# Patient Record
Sex: FEMALE | Race: WHITE | Employment: FULL TIME | URBAN - METROPOLITAN AREA
[De-identification: names, ages, dates, MRNs, and addresses within clinical notes are randomized per-mention and may not be internally consistent; named-entity substitution may affect disease eponyms.]

---

## 2017-11-29 ENCOUNTER — TRANSFERRED RECORDS (OUTPATIENT)
Dept: HEALTH INFORMATION MANAGEMENT | Facility: CLINIC | Age: 42
End: 2017-11-29

## 2017-11-29 LAB
HPV ABSTRACT: NORMAL
PAP-ABSTRACT: NORMAL

## 2020-01-19 ENCOUNTER — HOSPITAL ENCOUNTER (EMERGENCY)
Facility: CLINIC | Age: 45
Discharge: HOME | End: 2020-01-19
Attending: EMERGENCY MEDICINE | Admitting: EMERGENCY MEDICINE
Payer: COMMERCIAL

## 2020-01-19 ENCOUNTER — APPOINTMENT (OUTPATIENT)
Dept: CT IMAGING | Facility: CLINIC | Age: 45
End: 2020-01-19
Attending: EMERGENCY MEDICINE
Payer: COMMERCIAL

## 2020-01-19 VITALS
RESPIRATION RATE: 28 BRPM | OXYGEN SATURATION: 100 % | HEART RATE: 51 BPM | SYSTOLIC BLOOD PRESSURE: 105 MMHG | WEIGHT: 128 LBS | TEMPERATURE: 97.3 F | DIASTOLIC BLOOD PRESSURE: 54 MMHG

## 2020-01-19 DIAGNOSIS — N23 RENAL COLIC: ICD-10-CM

## 2020-01-19 LAB
ALBUMIN UR-MCNC: 10 MG/DL
ANION GAP SERPL CALCULATED.3IONS-SCNC: 9 MMOL/L (ref 3–14)
APPEARANCE UR: CLEAR
BACTERIA #/AREA URNS HPF: ABNORMAL /HPF
BASOPHILS # BLD AUTO: 0 10E9/L (ref 0–0.2)
BASOPHILS NFR BLD AUTO: 0.3 %
BILIRUB UR QL STRIP: NEGATIVE
BUN SERPL-MCNC: 15 MG/DL (ref 7–30)
CALCIUM SERPL-MCNC: 8.9 MG/DL (ref 8.5–10.1)
CHLORIDE SERPL-SCNC: 108 MMOL/L (ref 94–109)
CO2 SERPL-SCNC: 22 MMOL/L (ref 20–32)
COLOR UR AUTO: YELLOW
CREAT SERPL-MCNC: 0.87 MG/DL (ref 0.52–1.04)
DIFFERENTIAL METHOD BLD: NORMAL
EOSINOPHIL # BLD AUTO: 0.2 10E9/L (ref 0–0.7)
EOSINOPHIL NFR BLD AUTO: 2.2 %
ERYTHROCYTE [DISTWIDTH] IN BLOOD BY AUTOMATED COUNT: 12.3 % (ref 10–15)
GFR SERPL CREATININE-BSD FRML MDRD: 81 ML/MIN/{1.73_M2}
GLUCOSE SERPL-MCNC: 154 MG/DL (ref 70–99)
GLUCOSE UR STRIP-MCNC: NEGATIVE MG/DL
HCG SERPL QL: NEGATIVE
HCT VFR BLD AUTO: 41.6 % (ref 35–47)
HGB BLD-MCNC: 13.5 G/DL (ref 11.7–15.7)
HGB UR QL STRIP: NEGATIVE
IMM GRANULOCYTES # BLD: 0 10E9/L (ref 0–0.4)
IMM GRANULOCYTES NFR BLD: 0.3 %
KETONES UR STRIP-MCNC: 20 MG/DL
LEUKOCYTE ESTERASE UR QL STRIP: NEGATIVE
LYMPHOCYTES # BLD AUTO: 2.8 10E9/L (ref 0.8–5.3)
LYMPHOCYTES NFR BLD AUTO: 29.2 %
MCH RBC QN AUTO: 30.8 PG (ref 26.5–33)
MCHC RBC AUTO-ENTMCNC: 32.5 G/DL (ref 31.5–36.5)
MCV RBC AUTO: 95 FL (ref 78–100)
MONOCYTES # BLD AUTO: 0.7 10E9/L (ref 0–1.3)
MONOCYTES NFR BLD AUTO: 7.5 %
MUCOUS THREADS #/AREA URNS LPF: PRESENT /LPF
NEUTROPHILS # BLD AUTO: 5.8 10E9/L (ref 1.6–8.3)
NEUTROPHILS NFR BLD AUTO: 60.5 %
NITRATE UR QL: NEGATIVE
NRBC # BLD AUTO: 0 10*3/UL
NRBC BLD AUTO-RTO: 0 /100
PH UR STRIP: 6.5 PH (ref 5–7)
PLATELET # BLD AUTO: 189 10E9/L (ref 150–450)
POTASSIUM SERPL-SCNC: 3.8 MMOL/L (ref 3.4–5.3)
RBC # BLD AUTO: 4.38 10E12/L (ref 3.8–5.2)
RBC #/AREA URNS AUTO: 10 /HPF (ref 0–2)
SODIUM SERPL-SCNC: 139 MMOL/L (ref 133–144)
SOURCE: ABNORMAL
SP GR UR STRIP: 1.02 (ref 1–1.03)
SQUAMOUS #/AREA URNS AUTO: 1 /HPF (ref 0–1)
UROBILINOGEN UR STRIP-MCNC: NORMAL MG/DL (ref 0–2)
WBC # BLD AUTO: 9.6 10E9/L (ref 4–11)
WBC #/AREA URNS AUTO: 3 /HPF (ref 0–5)

## 2020-01-19 PROCEDURE — 25000132 ZZH RX MED GY IP 250 OP 250 PS 637: Performed by: EMERGENCY MEDICINE

## 2020-01-19 PROCEDURE — 25000128 H RX IP 250 OP 636: Performed by: EMERGENCY MEDICINE

## 2020-01-19 PROCEDURE — 74176 CT ABD & PELVIS W/O CONTRAST: CPT

## 2020-01-19 PROCEDURE — 85025 COMPLETE CBC W/AUTO DIFF WBC: CPT | Performed by: EMERGENCY MEDICINE

## 2020-01-19 PROCEDURE — 80048 BASIC METABOLIC PNL TOTAL CA: CPT | Performed by: EMERGENCY MEDICINE

## 2020-01-19 PROCEDURE — 25800030 ZZH RX IP 258 OP 636: Performed by: EMERGENCY MEDICINE

## 2020-01-19 PROCEDURE — 96374 THER/PROPH/DIAG INJ IV PUSH: CPT

## 2020-01-19 PROCEDURE — 99285 EMERGENCY DEPT VISIT HI MDM: CPT | Mod: 25

## 2020-01-19 PROCEDURE — 96376 TX/PRO/DX INJ SAME DRUG ADON: CPT

## 2020-01-19 PROCEDURE — 81001 URINALYSIS AUTO W/SCOPE: CPT | Performed by: EMERGENCY MEDICINE

## 2020-01-19 PROCEDURE — 84703 CHORIONIC GONADOTROPIN ASSAY: CPT | Performed by: EMERGENCY MEDICINE

## 2020-01-19 PROCEDURE — 96375 TX/PRO/DX INJ NEW DRUG ADDON: CPT

## 2020-01-19 PROCEDURE — 96361 HYDRATE IV INFUSION ADD-ON: CPT

## 2020-01-19 RX ORDER — ONDANSETRON 2 MG/ML
4 INJECTION INTRAMUSCULAR; INTRAVENOUS ONCE
Status: COMPLETED | OUTPATIENT
Start: 2020-01-19 | End: 2020-01-19

## 2020-01-19 RX ORDER — HYDROMORPHONE HYDROCHLORIDE 1 MG/ML
0.5 INJECTION, SOLUTION INTRAMUSCULAR; INTRAVENOUS; SUBCUTANEOUS ONCE
Status: COMPLETED | OUTPATIENT
Start: 2020-01-19 | End: 2020-01-19

## 2020-01-19 RX ORDER — KETOROLAC TROMETHAMINE 15 MG/ML
15 INJECTION, SOLUTION INTRAMUSCULAR; INTRAVENOUS ONCE
Status: COMPLETED | OUTPATIENT
Start: 2020-01-19 | End: 2020-01-19

## 2020-01-19 RX ORDER — SODIUM CHLORIDE 9 MG/ML
1000 INJECTION, SOLUTION INTRAVENOUS CONTINUOUS
Status: DISCONTINUED | OUTPATIENT
Start: 2020-01-19 | End: 2020-01-19 | Stop reason: HOSPADM

## 2020-01-19 RX ORDER — OXYCODONE HYDROCHLORIDE 5 MG/1
5 TABLET ORAL EVERY 6 HOURS PRN
Qty: 12 TABLET | Refills: 0 | Status: SHIPPED | OUTPATIENT
Start: 2020-01-19 | End: 2020-01-28

## 2020-01-19 RX ORDER — IBUPROFEN 600 MG/1
600 TABLET, FILM COATED ORAL EVERY 6 HOURS PRN
Qty: 30 TABLET | Refills: 1 | Status: SHIPPED | OUTPATIENT
Start: 2020-01-19 | End: 2020-01-28

## 2020-01-19 RX ORDER — OXYCODONE HYDROCHLORIDE 5 MG/1
5 TABLET ORAL ONCE
Status: COMPLETED | OUTPATIENT
Start: 2020-01-19 | End: 2020-01-19

## 2020-01-19 RX ORDER — ONDANSETRON 4 MG/1
4 TABLET, ORALLY DISINTEGRATING ORAL EVERY 6 HOURS PRN
Qty: 10 TABLET | Refills: 0 | Status: ON HOLD | OUTPATIENT
Start: 2020-01-19 | End: 2020-01-24

## 2020-01-19 RX ORDER — TAMSULOSIN HYDROCHLORIDE 0.4 MG/1
0.4 CAPSULE ORAL DAILY
Qty: 10 CAPSULE | Refills: 0 | Status: ON HOLD | OUTPATIENT
Start: 2020-01-19 | End: 2020-01-24

## 2020-01-19 RX ADMIN — SODIUM CHLORIDE 1000 ML: 9 INJECTION, SOLUTION INTRAVENOUS at 11:21

## 2020-01-19 RX ADMIN — ONDANSETRON HYDROCHLORIDE 4 MG: 2 INJECTION, SOLUTION INTRAMUSCULAR; INTRAVENOUS at 11:22

## 2020-01-19 RX ADMIN — HYDROMORPHONE HYDROCHLORIDE 0.5 MG: 1 INJECTION, SOLUTION INTRAMUSCULAR; INTRAVENOUS; SUBCUTANEOUS at 12:11

## 2020-01-19 RX ADMIN — ONDANSETRON HYDROCHLORIDE 4 MG: 2 INJECTION, SOLUTION INTRAMUSCULAR; INTRAVENOUS at 13:24

## 2020-01-19 RX ADMIN — OXYCODONE HYDROCHLORIDE 5 MG: 5 TABLET ORAL at 14:48

## 2020-01-19 RX ADMIN — KETOROLAC TROMETHAMINE 15 MG: 15 INJECTION, SOLUTION INTRAMUSCULAR; INTRAVENOUS at 11:21

## 2020-01-19 RX ADMIN — HYDROMORPHONE HYDROCHLORIDE 0.5 MG: 1 INJECTION, SOLUTION INTRAMUSCULAR; INTRAVENOUS; SUBCUTANEOUS at 11:22

## 2020-01-19 ASSESSMENT — ENCOUNTER SYMPTOMS
FLANK PAIN: 1
VOMITING: 1
NAUSEA: 1

## 2020-01-19 NOTE — ED TRIAGE NOTES
Left flank pain started today at 0900, nausea vomiting as well. She is feeling pressure and urinary urgency.

## 2020-01-19 NOTE — ED PROVIDER NOTES
History   Chief Complaint:  Flank Pain; Nausea & Vomiting; and Urgency     HPI   Miguelina Weathers is a 44 year old female with history of ruptured ovarian cysts who presents with flank pain, nausea, vomiting, and urgency.the patient reports she was uncomfortable last night with urinary urgency and thought she might be getting a bladder infection. However, this morning she had intense onset of left flank pain that started at 0900 with associated nausea and vomiting. She has not taken any medication for pain. The patient denies a history of kidney stones but does have a history of ruptured ovarian cysts.     Allergies:  Cephalexin  Codeine  Penicillins  Sulfa Drugs  Erythromycin    Medications:   Mirena    Past Medical History:    Menorrhagia with regular cycle  Ruptured ovarian cysts  UTI  Migraine    Past Surgical History:    [place] teeth extraction  Tonsillectomy   section  Hysteroscopy     Family History:    Mother: heart disease    Social History:  Smoking Status: Never Smoker  Smokeless Tobacco: Never Used  Alcohol Use: Yes    Review of Systems   Gastrointestinal: Positive for nausea and vomiting.   Genitourinary: Positive for flank pain and urgency.   All other systems reviewed and are negative.    Physical Exam     Patient Vitals for the past 24 hrs:   BP Temp Temp src Pulse Heart Rate Resp SpO2 Weight   20 1445 105/54 -- -- 51 -- -- 100 % --   20 1430 125/85 -- -- 59 -- -- 100 % --   20 1415 130/85 -- -- 56 -- -- 100 % --   20 1400 136/84 -- -- 78 -- -- 100 % --   20 1315 (!) 130/91 -- -- 59 -- -- 100 % --   20 1245 (!) 124/90 -- -- 64 -- -- 100 % --   20 1230 (!) 140/79 -- -- -- -- -- -- --   20 1200 123/82 -- -- 62 -- -- 100 % --   20 1145 (!) 141/99 -- -- 57 -- -- 100 % --   20 1130 (!) 143/75 -- -- 65 -- -- 99 % --   20 1115 (!) 143/82 -- -- 89 -- -- 93 % --   20 1105 (!) 134/94 97.3  F (36.3  C) Oral 74 74 28 100 % 58.1 kg  (128 lb)       Physical Exam  Vitals signs and nursing note reviewed.   Constitutional:       Comments: Uncomfortable appearing writhing in pain   HENT:      Head: Normocephalic.      Right Ear: Tympanic membrane normal.      Left Ear: Tympanic membrane normal.      Nose: Nose normal.      Mouth/Throat:      Mouth: Mucous membranes are moist.   Eyes:      Pupils: Pupils are equal, round, and reactive to light.   Neck:      Musculoskeletal: Normal range of motion.   Cardiovascular:      Rate and Rhythm: Normal rate and regular rhythm.      Pulses: Normal pulses.   Pulmonary:      Effort: Pulmonary effort is normal.   Abdominal:      General: Abdomen is flat.      Palpations: Abdomen is soft.      Comments: Pain localizes to the flank and mid abdomen.  No reproducible tenderness guarding or rebound   Musculoskeletal: Normal range of motion.   Skin:     General: Skin is warm.      Capillary Refill: Capillary refill takes less than 2 seconds.   Neurological:      General: No focal deficit present.      Mental Status: She is alert.   Psychiatric:         Mood and Affect: Mood normal.           Emergency Department Course   Imaging:  Radiology findings were communicated with the patient who voiced understanding of the findings.    Abd/pelvis CT no contrast - Stone Protocol  1. Obstructing 0.3 cm stone in the distal left ureter causes mild left  hydronephrosis.  2. IUD appears be in good position within the central uterus.  3. Small amount of free fluid in the pelvis is nonspecific, and could  be physiologic.  Reading per radiology     Laboratory:  Laboratory findings were communicated with the patient who voiced understanding of the findings.    CBC: WBC 9.6, HGB 13.5,   BMP: glucose 154(H) o/w WNL (Creatinine 0.87)  HCG qualitative pregnancy: negative    UA with microscopic: urineketon 20(H), RBC 10(H), bacteria few, mucous present o/w WNL     Interventions:  1121 NS 1000 ml IV  1121 Toradol 15 mg IV  1122 Zofran  4 mg IV  1122 Dilaudid 0.5 mg IV  1211 Dilaudid 0.5 mg IV  1324 Zofran 4 mg IV  1448 Oxycodone 5 mg Oral    Emergency Department Course:  Nursing notes and vitals reviewed.    1118 I performed an exam of the patient as documented above.     IV was inserted and blood was drawn for laboratory testing, results above.     1202 I returned to check on the patient. She states her pain is improved though still present.     The patient provided a urine sample here in the emergency department. This was sent for laboratory testing, findings above.    The patient was sent for a CT Abdomen Pelvis while in the emergency department, results above.      1358 I rechecked the patient. Explained findings to the Patient.    Findings and plan explained to the Patient. Patient discharged home with instructions regarding supportive care, medications, and reasons to return. The importance of close follow-up was reviewed. The patient was prescribed Ibuprofen ,Flomax, Zofran, and Norco.     Impression & Plan    Medical Decision Making:  Miguelina Weathers is a 44 year old female who presents to the emergency department today for evaluation of flank pain and nausea and vomiting.  Patient's clinical presentation is highly suspicious for renal colic patient does not have a history of this.  UA shows trace microscopic hematuria.  CT is positive for stone pain was managed in the emergency room.  Ultimately patient did seem to improve was offered oral medications for pain and follow-up with urology return with pain uncontrolled vomiting vomiting unable to swallow pills or fever.    Diagnosis:    ICD-10-CM    1. Renal colic N23        Disposition:   discharged to home    Discharge Medications:  New Prescriptions    IBUPROFEN (ADVIL/MOTRIN) 600 MG TABLET    Take 1 tablet (600 mg) by mouth every 6 hours as needed for moderate pain    ONDANSETRON (ZOFRAN ODT) 4 MG ODT TAB    Take 1 tablet (4 mg) by mouth every 6 hours as needed    OXYCODONE (ROXICODONE)  5 MG TABLET    Take 1 tablet (5 mg) by mouth every 6 hours as needed for pain    TAMSULOSIN (FLOMAX) 0.4 MG CAPSULE    Take 1 capsule (0.4 mg) by mouth daily for 10 doses       Scribe Disclosure:  I, Halina Barton, am serving as a scribe at 11:15 AM on 1/19/2020 to document services personally performed by Prakash Peñaloza MD based on my observations and the provider's statements to me.   [facility] EMERGENCY DEPARTMENT       Prakash Peñaloza MD  01/22/20 7383

## 2020-01-19 NOTE — DISCHARGE INSTRUCTIONS
Your CT scan has shown a 3 mm stone in the left ureter.  Please use medication for pain as needed and nausea as needed.  Return to the emergency room with pain not controlled with medication vomiting vomiting unable to follow-up pills or fever greater than 100.4.  If pain ongoing and does not resolve please follow-up with urology

## 2020-01-19 NOTE — ED AVS SNAPSHOT
[facility] Emergency Department  [address]  [facility] [address]  Phone:  [phone]  Fax:  [phone]                                    Miguelina Weathers   MRN: [id number]    Department:  [facility] Emergency Department   Date of Visit:  1/19/2020           After Visit Summary Signature Page    I have received my discharge instructions, and my questions have been answered. I have discussed any challenges I see with this plan with the nurse or doctor.    ..........................................................................................................................................  Patient/Patient Representative Signature      ..........................................................................................................................................  Patient Representative Print Name and Relationship to Patient    ..................................................               ................................................  Date                                   Time    ..........................................................................................................................................  Reviewed by Signature/Title    ...................................................              ..............................................  Date                                               Time          22EPIC Rev 08/18

## 2020-01-21 ENCOUNTER — APPOINTMENT (OUTPATIENT)
Dept: CT IMAGING | Facility: CLINIC | Age: 45
DRG: 690 | End: 2020-01-21
Attending: EMERGENCY MEDICINE
Payer: COMMERCIAL

## 2020-01-21 ENCOUNTER — HOSPITAL ENCOUNTER (INPATIENT)
Facility: CLINIC | Age: 45
LOS: 2 days | Discharge: HOME | DRG: 690 | End: 2020-01-24
Attending: EMERGENCY MEDICINE | Admitting: INTERNAL MEDICINE
Payer: COMMERCIAL

## 2020-01-21 DIAGNOSIS — N12 PYELONEPHRITIS: ICD-10-CM

## 2020-01-21 PROBLEM — R10.9 FLANK PAIN: Status: ACTIVE | Noted: 2020-01-21

## 2020-01-21 LAB
ALBUMIN UR-MCNC: 50 MG/DL
ANION GAP SERPL CALCULATED.3IONS-SCNC: 5 MMOL/L (ref 3–14)
APPEARANCE UR: CLEAR
BACTERIA #/AREA URNS HPF: ABNORMAL /HPF
BASOPHILS # BLD AUTO: 0 10E9/L (ref 0–0.2)
BASOPHILS NFR BLD AUTO: 0.2 %
BILIRUB UR QL STRIP: NEGATIVE
BUN SERPL-MCNC: 11 MG/DL (ref 7–30)
CALCIUM SERPL-MCNC: 8.6 MG/DL (ref 8.5–10.1)
CHLORIDE SERPL-SCNC: 107 MMOL/L (ref 94–109)
CO2 SERPL-SCNC: 28 MMOL/L (ref 20–32)
COLOR UR AUTO: YELLOW
COPATH REPORT: NORMAL
CREAT SERPL-MCNC: 0.96 MG/DL (ref 0.52–1.04)
DIFFERENTIAL METHOD BLD: ABNORMAL
EOSINOPHIL # BLD AUTO: 0 10E9/L (ref 0–0.7)
EOSINOPHIL NFR BLD AUTO: 0 %
ERYTHROCYTE [DISTWIDTH] IN BLOOD BY AUTOMATED COUNT: 12.5 % (ref 10–15)
FLUAV+FLUBV AG SPEC QL: NEGATIVE
FLUAV+FLUBV AG SPEC QL: NEGATIVE
GFR SERPL CREATININE-BSD FRML MDRD: 71 ML/MIN/{1.73_M2}
GLUCOSE SERPL-MCNC: 96 MG/DL (ref 70–99)
GLUCOSE UR STRIP-MCNC: NEGATIVE MG/DL
HCT VFR BLD AUTO: 39.2 % (ref 35–47)
HGB BLD-MCNC: 12.7 G/DL (ref 11.7–15.7)
HGB UR QL STRIP: ABNORMAL
IMM GRANULOCYTES # BLD: 0.1 10E9/L (ref 0–0.4)
IMM GRANULOCYTES NFR BLD: 0.6 %
KETONES UR STRIP-MCNC: 40 MG/DL
LACTATE BLD-SCNC: 0.6 MMOL/L (ref 0.7–2)
LACTATE BLD-SCNC: 1 MMOL/L (ref 0.7–2)
LEUKOCYTE ESTERASE UR QL STRIP: NEGATIVE
LYMPHOCYTES # BLD AUTO: 0.8 10E9/L (ref 0.8–5.3)
LYMPHOCYTES NFR BLD AUTO: 4.9 %
MCH RBC QN AUTO: 30.8 PG (ref 26.5–33)
MCHC RBC AUTO-ENTMCNC: 32.4 G/DL (ref 31.5–36.5)
MCV RBC AUTO: 95 FL (ref 78–100)
MONOCYTES # BLD AUTO: 1.5 10E9/L (ref 0–1.3)
MONOCYTES NFR BLD AUTO: 9.1 %
MUCOUS THREADS #/AREA URNS LPF: PRESENT /LPF
NEUTROPHILS # BLD AUTO: 13.6 10E9/L (ref 1.6–8.3)
NEUTROPHILS NFR BLD AUTO: 85.2 %
NITRATE UR QL: NEGATIVE
NRBC # BLD AUTO: 0 10*3/UL
NRBC BLD AUTO-RTO: 0 /100
PH UR STRIP: 5.5 PH (ref 5–7)
PLATELET # BLD AUTO: 119 10E9/L (ref 150–450)
POTASSIUM SERPL-SCNC: 3.6 MMOL/L (ref 3.4–5.3)
RBC # BLD AUTO: 4.13 10E12/L (ref 3.8–5.2)
RBC #/AREA URNS AUTO: 7 /HPF (ref 0–2)
SODIUM SERPL-SCNC: 140 MMOL/L (ref 133–144)
SOURCE: ABNORMAL
SP GR UR STRIP: 1.02 (ref 1–1.03)
SPECIMEN SOURCE: NORMAL
SQUAMOUS #/AREA URNS AUTO: 1 /HPF (ref 0–1)
UROBILINOGEN UR STRIP-MCNC: NORMAL MG/DL (ref 0–2)
WBC # BLD AUTO: 16 10E9/L (ref 4–11)
WBC #/AREA URNS AUTO: 7 /HPF (ref 0–5)

## 2020-01-21 PROCEDURE — 74176 CT ABD & PELVIS W/O CONTRAST: CPT

## 2020-01-21 PROCEDURE — 83605 ASSAY OF LACTIC ACID: CPT | Performed by: EMERGENCY MEDICINE

## 2020-01-21 PROCEDURE — G0378 HOSPITAL OBSERVATION PER HR: HCPCS

## 2020-01-21 PROCEDURE — 87088 URINE BACTERIA CULTURE: CPT | Performed by: EMERGENCY MEDICINE

## 2020-01-21 PROCEDURE — 80048 BASIC METABOLIC PNL TOTAL CA: CPT | Performed by: EMERGENCY MEDICINE

## 2020-01-21 PROCEDURE — 36415 COLL VENOUS BLD VENIPUNCTURE: CPT | Performed by: INTERNAL MEDICINE

## 2020-01-21 PROCEDURE — 87086 URINE CULTURE/COLONY COUNT: CPT | Performed by: EMERGENCY MEDICINE

## 2020-01-21 PROCEDURE — 25800030 ZZH RX IP 258 OP 636: Performed by: INTERNAL MEDICINE

## 2020-01-21 PROCEDURE — 96374 THER/PROPH/DIAG INJ IV PUSH: CPT

## 2020-01-21 PROCEDURE — 25000132 ZZH RX MED GY IP 250 OP 250 PS 637: Performed by: EMERGENCY MEDICINE

## 2020-01-21 PROCEDURE — 25800030 ZZH RX IP 258 OP 636: Performed by: EMERGENCY MEDICINE

## 2020-01-21 PROCEDURE — 87804 INFLUENZA ASSAY W/OPTIC: CPT | Performed by: EMERGENCY MEDICINE

## 2020-01-21 PROCEDURE — 96361 HYDRATE IV INFUSION ADD-ON: CPT

## 2020-01-21 PROCEDURE — 81001 URINALYSIS AUTO W/SCOPE: CPT | Performed by: EMERGENCY MEDICINE

## 2020-01-21 PROCEDURE — 88300 SURGICAL PATH GROSS: CPT | Performed by: EMERGENCY MEDICINE

## 2020-01-21 PROCEDURE — 96375 TX/PRO/DX INJ NEW DRUG ADDON: CPT

## 2020-01-21 PROCEDURE — 99218 ZZC INITIAL OBSERVATION CARE,LEVL I: CPT | Performed by: INTERNAL MEDICINE

## 2020-01-21 PROCEDURE — 87186 SC STD MICRODIL/AGAR DIL: CPT | Performed by: EMERGENCY MEDICINE

## 2020-01-21 PROCEDURE — 25000128 H RX IP 250 OP 636: Performed by: EMERGENCY MEDICINE

## 2020-01-21 PROCEDURE — 25000132 ZZH RX MED GY IP 250 OP 250 PS 637: Performed by: INTERNAL MEDICINE

## 2020-01-21 PROCEDURE — 99285 EMERGENCY DEPT VISIT HI MDM: CPT | Mod: 25

## 2020-01-21 PROCEDURE — 83605 ASSAY OF LACTIC ACID: CPT | Performed by: INTERNAL MEDICINE

## 2020-01-21 PROCEDURE — 82365 CALCULUS SPECTROSCOPY: CPT | Performed by: EMERGENCY MEDICINE

## 2020-01-21 PROCEDURE — 85025 COMPLETE CBC W/AUTO DIFF WBC: CPT | Performed by: EMERGENCY MEDICINE

## 2020-01-21 PROCEDURE — 88300 SURGICAL PATH GROSS: CPT | Mod: 26 | Performed by: EMERGENCY MEDICINE

## 2020-01-21 RX ORDER — ONDANSETRON 4 MG/1
4 TABLET, ORALLY DISINTEGRATING ORAL EVERY 6 HOURS PRN
Status: DISCONTINUED | OUTPATIENT
Start: 2020-01-21 | End: 2020-01-24 | Stop reason: HOSPADM

## 2020-01-21 RX ORDER — BISACODYL 10 MG
10 SUPPOSITORY, RECTAL RECTAL DAILY PRN
Status: DISCONTINUED | OUTPATIENT
Start: 2020-01-21 | End: 2020-01-24 | Stop reason: HOSPADM

## 2020-01-21 RX ORDER — NALOXONE HYDROCHLORIDE 0.4 MG/ML
.1-.4 INJECTION, SOLUTION INTRAMUSCULAR; INTRAVENOUS; SUBCUTANEOUS
Status: DISCONTINUED | OUTPATIENT
Start: 2020-01-21 | End: 2020-01-24 | Stop reason: HOSPADM

## 2020-01-21 RX ORDER — HYDROXYZINE HYDROCHLORIDE 25 MG/1
25-50 TABLET, FILM COATED ORAL EVERY 6 HOURS PRN
Status: DISCONTINUED | OUTPATIENT
Start: 2020-01-21 | End: 2020-01-24 | Stop reason: HOSPADM

## 2020-01-21 RX ORDER — PROCHLORPERAZINE 25 MG
25 SUPPOSITORY, RECTAL RECTAL EVERY 12 HOURS PRN
Status: DISCONTINUED | OUTPATIENT
Start: 2020-01-21 | End: 2020-01-24 | Stop reason: HOSPADM

## 2020-01-21 RX ORDER — KETOROLAC TROMETHAMINE 15 MG/ML
15 INJECTION, SOLUTION INTRAMUSCULAR; INTRAVENOUS ONCE
Status: COMPLETED | OUTPATIENT
Start: 2020-01-21 | End: 2020-01-21

## 2020-01-21 RX ORDER — ACETAMINOPHEN 325 MG/1
650 TABLET ORAL EVERY 4 HOURS PRN
Status: DISCONTINUED | OUTPATIENT
Start: 2020-01-21 | End: 2020-01-24 | Stop reason: HOSPADM

## 2020-01-21 RX ORDER — SODIUM CHLORIDE 9 MG/ML
1000 INJECTION, SOLUTION INTRAVENOUS CONTINUOUS
Status: DISCONTINUED | OUTPATIENT
Start: 2020-01-21 | End: 2020-01-21

## 2020-01-21 RX ORDER — SODIUM CHLORIDE 9 MG/ML
INJECTION, SOLUTION INTRAVENOUS CONTINUOUS
Status: DISCONTINUED | OUTPATIENT
Start: 2020-01-21 | End: 2020-01-23

## 2020-01-21 RX ORDER — IBUPROFEN 400 MG/1
400 TABLET, FILM COATED ORAL EVERY 6 HOURS PRN
Status: DISCONTINUED | OUTPATIENT
Start: 2020-01-21 | End: 2020-01-24 | Stop reason: HOSPADM

## 2020-01-21 RX ORDER — HYDROMORPHONE HYDROCHLORIDE 1 MG/ML
0.5 INJECTION, SOLUTION INTRAMUSCULAR; INTRAVENOUS; SUBCUTANEOUS ONCE
Status: COMPLETED | OUTPATIENT
Start: 2020-01-21 | End: 2020-01-21

## 2020-01-21 RX ORDER — ACETAMINOPHEN 325 MG/1
975 TABLET ORAL ONCE
Status: COMPLETED | OUTPATIENT
Start: 2020-01-21 | End: 2020-01-21

## 2020-01-21 RX ORDER — PROCHLORPERAZINE MALEATE 5 MG
10 TABLET ORAL EVERY 6 HOURS PRN
Status: DISCONTINUED | OUTPATIENT
Start: 2020-01-21 | End: 2020-01-24 | Stop reason: HOSPADM

## 2020-01-21 RX ORDER — POLYETHYLENE GLYCOL 3350 17 G/17G
17 POWDER, FOR SOLUTION ORAL DAILY PRN
Status: DISCONTINUED | OUTPATIENT
Start: 2020-01-21 | End: 2020-01-23

## 2020-01-21 RX ORDER — CIPROFLOXACIN 500 MG/1
500 TABLET, FILM COATED ORAL ONCE
Status: COMPLETED | OUTPATIENT
Start: 2020-01-21 | End: 2020-01-21

## 2020-01-21 RX ORDER — HYDROMORPHONE HYDROCHLORIDE 2 MG/1
2 TABLET ORAL EVERY 4 HOURS PRN
Status: DISCONTINUED | OUTPATIENT
Start: 2020-01-21 | End: 2020-01-24 | Stop reason: HOSPADM

## 2020-01-21 RX ORDER — LORAZEPAM 2 MG/ML
0.5 INJECTION INTRAMUSCULAR ONCE
Status: COMPLETED | OUTPATIENT
Start: 2020-01-21 | End: 2020-01-21

## 2020-01-21 RX ORDER — HYDROMORPHONE HYDROCHLORIDE 1 MG/ML
0.2 INJECTION, SOLUTION INTRAMUSCULAR; INTRAVENOUS; SUBCUTANEOUS
Status: DISCONTINUED | OUTPATIENT
Start: 2020-01-21 | End: 2020-01-24 | Stop reason: HOSPADM

## 2020-01-21 RX ORDER — CIPROFLOXACIN 500 MG/1
500 TABLET, FILM COATED ORAL EVERY 12 HOURS SCHEDULED
Status: DISCONTINUED | OUTPATIENT
Start: 2020-01-22 | End: 2020-01-24 | Stop reason: HOSPADM

## 2020-01-21 RX ORDER — AMOXICILLIN 250 MG
2 CAPSULE ORAL 2 TIMES DAILY
Status: DISCONTINUED | OUTPATIENT
Start: 2020-01-21 | End: 2020-01-24 | Stop reason: HOSPADM

## 2020-01-21 RX ORDER — ONDANSETRON 2 MG/ML
4 INJECTION INTRAMUSCULAR; INTRAVENOUS ONCE
Status: COMPLETED | OUTPATIENT
Start: 2020-01-21 | End: 2020-01-21

## 2020-01-21 RX ORDER — AMOXICILLIN 250 MG
1 CAPSULE ORAL 2 TIMES DAILY
Status: DISCONTINUED | OUTPATIENT
Start: 2020-01-21 | End: 2020-01-24 | Stop reason: HOSPADM

## 2020-01-21 RX ORDER — ONDANSETRON 2 MG/ML
4 INJECTION INTRAMUSCULAR; INTRAVENOUS EVERY 6 HOURS PRN
Status: DISCONTINUED | OUTPATIENT
Start: 2020-01-21 | End: 2020-01-24 | Stop reason: HOSPADM

## 2020-01-21 RX ADMIN — ONDANSETRON HYDROCHLORIDE 4 MG: 2 INJECTION, SOLUTION INTRAMUSCULAR; INTRAVENOUS at 14:08

## 2020-01-21 RX ADMIN — IBUPROFEN 400 MG: 400 TABLET ORAL at 23:24

## 2020-01-21 RX ADMIN — CIPROFLOXACIN HYDROCHLORIDE 500 MG: 500 TABLET, FILM COATED ORAL at 23:24

## 2020-01-21 RX ADMIN — ACETAMINOPHEN 650 MG: 325 TABLET, FILM COATED ORAL at 21:04

## 2020-01-21 RX ADMIN — KETOROLAC TROMETHAMINE 15 MG: 15 INJECTION, SOLUTION INTRAMUSCULAR; INTRAVENOUS at 14:08

## 2020-01-21 RX ADMIN — ACETAMINOPHEN 975 MG: 325 TABLET, FILM COATED ORAL at 15:14

## 2020-01-21 RX ADMIN — LORAZEPAM 0.5 MG: 2 INJECTION INTRAMUSCULAR; INTRAVENOUS at 15:34

## 2020-01-21 RX ADMIN — HYDROMORPHONE HYDROCHLORIDE 0.5 MG: 1 INJECTION, SOLUTION INTRAMUSCULAR; INTRAVENOUS; SUBCUTANEOUS at 15:31

## 2020-01-21 RX ADMIN — SODIUM CHLORIDE 500 ML: 9 INJECTION, SOLUTION INTRAVENOUS at 16:26

## 2020-01-21 RX ADMIN — SODIUM CHLORIDE 1000 ML: 9 INJECTION, SOLUTION INTRAVENOUS at 14:04

## 2020-01-21 RX ADMIN — CIPROFLOXACIN HYDROCHLORIDE 500 MG: 500 TABLET, FILM COATED ORAL at 15:30

## 2020-01-21 RX ADMIN — SODIUM CHLORIDE: 9 INJECTION, SOLUTION INTRAVENOUS at 17:55

## 2020-01-21 RX ADMIN — SENNOSIDES AND DOCUSATE SODIUM 1 TABLET: 8.6; 5 TABLET ORAL at 21:04

## 2020-01-21 ASSESSMENT — ENCOUNTER SYMPTOMS
FLANK PAIN: 1
VOMITING: 0
NAUSEA: 1
FEVER: 1
HEADACHES: 1

## 2020-01-21 ASSESSMENT — MIFFLIN-ST. JEOR: SCORE: 1351.25

## 2020-01-21 NOTE — ED PROVIDER NOTES
I received patient in signout from Dr. Peñaloza.  Please refer to their complete H&P for further information.  Briefly, patient presented with L. Flank pain, recently diagnosed with kidney stone.  Concern for septic stone/pyelonephritis given uptrending leukocytosis and febrile. At time of signout, CT pending.     Abd/pelvis CT no contrast - Stone Protocol   Final Result   IMPRESSION:   1. No radiodense kidney stones or hydronephrosis in either kidney. The   previously seen distal left ureter stone is no longer seen, likely   passed.   2. Moderate amount of stool throughout the colon.   3. Small amount of free fluid in the pelvis, of indeterminate   etiology.      VAL KUMAR MD            3:52 PM  I spoke to Dr. Butler regarding admission. Concerns for pyelonephritis/failed outpatient management.  No evidence to suggest retained kidney stone on CT.  Patient given antibiotics by my partner, meeting sepsis criteria.  She remained hemodynamically stable.  Patient agreeable to admission.         Ewa Barclay,   1/21/2020   [facility] EMERGENCY DEPARTMENT       Ewa Barclay,   01/21/20 5745

## 2020-01-21 NOTE — PHARMACY-ADMISSION MEDICATION HISTORY
Admission medication history interview status for this patient is complete. See [facility] admission navigator for allergy information, prior to admission medications and immunization status.     Medication history interview source(s):Patient  Medication history resources (including written lists, pill bottles, clinic record):[facility] List  Primary pharmacy:[facility]    Changes made to PTA medication list:  Added: -  Deleted: -  Changed: -    Actions taken by pharmacist (provider contacted, etc):None     Additional medication history information:None    Medication reconciliation/reorder completed by provider prior to medication history?  No     Do you take OTC medications (eg tylenol, ibuprofen, fish oil, eye/ear drops, etc)? No     For patients on insulin therapy: No    Prior to Admission medications    Medication Sig Last Dose Taking? Auth Provider   ibuprofen (ADVIL/MOTRIN) 600 MG tablet Take 1 tablet (600 mg) by mouth every 6 hours as needed for moderate pain 1/21/2020 at 0630 Yes Prakash Peñaloza MD   ondansetron (ZOFRAN ODT) 4 MG ODT tab Take 1 tablet (4 mg) by mouth every 6 hours as needed 1/21/2020 at 0630 Yes Prakash Peñaloza MD   oxyCODONE (ROXICODONE) 5 MG tablet Take 1 tablet (5 mg) by mouth every 6 hours as needed for pain 1/21/2020 at 0630 Yes Prakash Peñaloza MD   tamsulosin (FLOMAX) 0.4 MG capsule Take 1 capsule (0.4 mg) by mouth daily for 10 doses 1/20/2020 at pm Yes Prakash Peñaloza MD

## 2020-01-21 NOTE — ED PROVIDER NOTES
History     Chief Complaint:  Flank Pain and Fever    The history is provided by the patient.      Miguelina Weathers is a 44 year old female recently diagnosed with a kidney stone who presents to the emergency department today for evaluation of left flank pain and a fever. Per chart review- the patient was seen here in the emergency department three days ago () for evaluation of flank pain, nausea, vomiting, and urinary urgency; she was diagnosed with a 0.3 cm kidney stone at that time and discharged with Zofran, Oxycodone, and Tamsulosin. The patient reports she was doing okay but had an onset of a fever last night that was as high as 101.3, she has also had a headache, continued left flank pain, and nausea. The patient has not vomited. She adds that she has been straining her urine and brought a stone that she recently passed.     Previous Results, 2020  Abd/pelvis CT no contrast - Stone Protocol  1. Obstructing 0.3 cm stone in the distal left ureter causes mild left hydronephrosis.  2. IUD appears be in good position within the central uterus.  3. Small amount of free fluid in the pelvis is nonspecific, and could be physiologic.    Laboratory Results  CBC: WBC 9.6, HGB 13.5,   BMP: glucose 154(H) o/w WNL (Creatinine 0.87)  HCG qualitative pregnancy: negative  UA with microscopic: urineketon 20(H), RBC 10(H), bacteria few, mucous present o/w WNL       Allergies:  Cephalexin  Codeine  Penicillins  Sulfa Drugs  Erythromycin     Medications:    Mirena IUD in place  Zofran  Oxycodone  Tamsulosin     Past Medical History:    Migraines  Menorrhagia with regular cycle  Intramural leiomyoma of uterus   Urinary tract infection   Kidney stone     Past Surgical History:    [place] teeth extraction  Tonsillectomy   section   Hysteroscopy     Family History:    Heart disease- Mother     Social History:  The patient was accompanied to the ED.  Smoking Status: Never Smoker  Smokeless Tobacco: Never  Used  Alcohol Use: Positive   Marital Status:  Single    Review of Systems   Constitutional: Positive for fever.   Gastrointestinal: Positive for nausea. Negative for vomiting.   Genitourinary: Positive for flank pain (left).   Neurological: Positive for headaches.   All other systems reviewed and are negative.        Physical Exam     Patient Vitals for the past 24 hrs:   BP Temp Temp src Pulse Resp SpO2 Weight   01/21/20 1445 -- -- -- -- -- 100 % --   01/21/20 1405 -- -- -- -- -- -- 58.1 kg (128 lb)   01/21/20 1321 113/73 98.7  F (37.1  C) Oral 101 18 100 % --      Physical Exam  Vitals signs and nursing note reviewed.   Constitutional:       Comments: Uncomfortable appearing, tearful     HENT:      Head: Normocephalic.      Right Ear: Tympanic membrane normal.      Left Ear: Tympanic membrane normal.      Mouth/Throat:      Mouth: Mucous membranes are moist.   Eyes:      General: No scleral icterus.  Cardiovascular:      Rate and Rhythm: Normal rate and regular rhythm.   Pulmonary:      Effort: Pulmonary effort is normal.      Breath sounds: Normal breath sounds.   Abdominal:      General: Abdomen is flat.      Palpations: Abdomen is soft.      Tenderness: There is left CVA tenderness.   Musculoskeletal: Normal range of motion.   Skin:     General: Skin is warm.      Capillary Refill: Capillary refill takes less than 2 seconds.   Neurological:      General: No focal deficit present.      Mental Status: She is alert.   Psychiatric:         Mood and Affect: Mood normal.         Emergency Department Course     Imaging:  Radiology findings were communicated with the patient and family who voiced understanding of the findings.  CT, Abdomen & Pelvis without Contrast, Stone Protocol  1. No radiodense kidney stones or hydronephrosis in either kidney. The  previously seen distal left ureter stone is no longer seen, likely  passed.  2. Moderate amount of stool throughout the colon.  3. Small amount of free fluid in the  pelvis, of indeterminate  etiology.  Reading per radiology    Laboratory:  Laboratory findings were communicated with the patient and family who voiced understanding of the findings.  CBC: WNL (WBC 16.0, HGB 12.7, )  BMP: AWNL (Creatinine 0.96)     Influenza A/B Antigen: Negative    Lactic Acid (Collected at 1403): 1.0   Stone Analysis: Pending   UA with Microscopic: Clear, yellow urine with ketones of 40, moderate blood, protein albumin of 50, WBC of 7 (H), RBC of 7 (H), moderate bacteria, and mucous present. O/w WNL.     Urine Culture Aerobic Bacterial: Pending     Interventions:  1404 0.9% NaCl 1L IV  1408 Toradol 15 mg IV   1408 Zofran 4 mg IV   1514 Acetaminophen 975 mg PO  1530 Ciprofloxacin 500 mg PO  1531 Dilaudid 0.5 mg IV  1534 Ativan 0.5 mg IV    Emergency Department Course:  1354 Nursing notes and vitals reviewed.  1356 I performed an exam of the patient as documented above.   1404 IV was inserted and blood was drawn for laboratory testing, results above.   1423 The patient provided a urine sample here in the emergency department. This was sent for laboratory testing, findings above.   1518 Swab sample obtained for laboratory testing, results above.    1550 Dr. Barclay spoke with Dr. Butler of the hospitalist service from [place] regarding patient's presentation, findings, and plan of care. Please see her note for additional information.     Findings and plan explained to the Patient and family who consents to admission. Discussed the patient with Dr. Butler, who will admit the patient to a medical bed for further monitoring, evaluation, and treatment.     I personally reviewed the laboratory and imaging results with the Patient and family and answered all related questions prior to admission.    Impression & Plan      Medical Decision Making:  Pt presents after recent stone and stone passage, now with ongoing flank pain and fever. Pt is uncomfortable appearing, but brings in stone in  matt. Pt states had fever at home, but no fever in ER, and labs show leukocytosis, but urine borderline for infection. Clinically suspect pyelonephritis, but CT orderd to confirm stone passage, as well as no complications from pyelo like urinoma, or abscess. PT recommended admission due to fever post kidney stone and concern for polymicrobial or complicated pyelonephritis.         Diagnosis:    ICD-10-CM    1. Pyelonephritis N12 Urine Culture Aerobic Bacterial     Stone analysis     Lactic acid level STAT     Basic metabolic panel     CBC with platelets     acetaminophen (TYLENOL) 325 MG tablet     hydrOXYzine (ATARAX) 25 MG tablet     ciprofloxacin (CIPRO) 500 MG tablet     SUMAtriptan (IMITREX) 25 MG tablet       Disposition:  The patient is admitted into the care of Dr. Butler.     Scribe Disclosure:  I, Josiane Clarke, am serving as a scribe at 1:56 PM on 1/21/2020 to document services personally performed by Prakash Peñaloza MD based on my observations and the provider's statements to me.    1/21/2020   [facility] EMERGENCY DEPARTMENT       Prakash Peñaloza MD  01/28/20 0701

## 2020-01-21 NOTE — H&P
[facility]    History and Physical - Hospitalist Service       Date of Admission:  1/21/2020    Assessment & Plan   Miguelina Weathers is a 44 year old female admitted on 1/21/2020. She has no known significant past medical history.  She was recently found to have left ureterolithiasis 2 days ago.  Return to emergency room today with fever, flank pain, nausea, vomiting, general body aches.  Diagnosed with possible pyelonephritis.    1.  Possible pyelonephritis.  Fairly unremarkable urinalysis.  Started on oral ciprofloxacin in the emergency room.  Allergic to penicillin and cephalosporins.  Continue ciprofloxacin.  Pain medications as needed.  IV fluids.    2.  Recent ureterolithiasis.  Now with passed stone.  Repeat CT scan today shows no evidence of residual stone.     Diet: Regular diet  DVT Prophylaxis: Ambulate every shift  Reyes Catheter: not present  Code Status: Full code    Disposition Plan   Expected discharge: Tomorrow, recommended to prior living arrangement     Elbert Butler, DO  [facility]    ______________________________________________________________________    Chief Complaint   Fevers, chills, nausea, vomiting, body aches, left flank pain.    History is obtained from the patient    History of Present Illness   Miguelina Weathers is a 44 year old female who has no known significant medical history.  She was found to have ureterolithiasis 2 days ago.  She has been straining her urine.  She did urinate out a small stone today.  She did develop fevers overnight.  Also having occasional chills.  Has developed body aches.  Also having nausea and vomiting.  Having left flank pain.  Does have some pain with urination.  Notes that urine has a foul smell.  Present to emergency room for evaluation of the symptoms.  She was taking antiemetics at home.  Antiemetics were helping for a short period of time with her nausea.  Also taking pain medications at home.  Pain medications would  help for a short period of time.  Did not seem to have prolonged effect.  She does not remember ever have anything like this previously.  No other acute complaints.    Review of Systems    The 10 point Review of Systems is negative other than noted in the HPI     Past Medical History    I have reviewed this patient's medical history and updated it with pertinent information if needed.   Ureterolithiasis.    Past Surgical History   I have reviewed this patient's surgical history and updated it with pertinent information if needed.    Social History   I have reviewed this patient's social history and updated it with pertinent information if needed.  Does not smoke.  Occasionally drinks alcohol.    Family History   I have reviewed this patient's family history and updated it with pertinent information if needed.   Father with multiple sclerosis.    Prior to Admission Medications   Prior to Admission Medications   Prescriptions Last Dose Informant Patient Reported? Taking?   ibuprofen (ADVIL/MOTRIN) 600 MG tablet 1/21/2020 at 0630  No Yes   Sig: Take 1 tablet (600 mg) by mouth every 6 hours as needed for moderate pain   ondansetron (ZOFRAN ODT) 4 MG ODT tab 1/21/2020 at 0630  No Yes   Sig: Take 1 tablet (4 mg) by mouth every 6 hours as needed   oxyCODONE (ROXICODONE) 5 MG tablet 1/21/2020 at 0630  No Yes   Sig: Take 1 tablet (5 mg) by mouth every 6 hours as needed for pain   tamsulosin (FLOMAX) 0.4 MG capsule 1/20/2020 at pm  No Yes   Sig: Take 1 capsule (0.4 mg) by mouth daily for 10 doses      Facility-Administered Medications: None     Allergies   Allergies   Allergen Reactions     Cephalexin Hives     Codeine Hives     Penicillins Hives     Sulfa Drugs Hives     Erythromycin Other (See Comments)     ? Rheumatic fever       Physical Exam   Vital Signs: Temp: 98.7  F (37.1  C) Temp src: Oral BP: 109/70 Pulse: 87   Resp: 18 SpO2: 91 % O2 Device: None (Room air)    Weight: 128 lbs 0 oz    Gen:  NAD, A&Ox3.  Eyes:   PERRL, sclera anicteric.  OP:  MMM, no lesions.  Neck:  Supple.  CV:  Regular, no murmurs.  Lung:  CTA b/l, normal effort.  Ab:  +BS, soft.  Skin:  Warm, dry to touch.  No rash.  Ext:  No pitting edema LE b/l.      Data   Data reviewed today: I reviewed all medications, new labs and imaging results over the last 24 hours.    Recent Labs   Lab 01/21/20  1403 01/19/20  1123   WBC 16.0* 9.6   HGB 12.7 13.5   MCV 95 95   * 189    139   POTASSIUM 3.6 3.8   CHLORIDE 107 108   CO2 28 22   BUN 11 15   CR 0.96 0.87   ANIONGAP 5 9   KHANH 8.6 8.9   GLC 96 154*

## 2020-01-21 NOTE — ED TRIAGE NOTES
Patient comes in for evaluation of increased left flank pain and a fever. Patient was diagnosed with a kidney stone on Sunday, yesterday started having a fever and increased pain. ABCs intact.

## 2020-01-21 NOTE — ED NOTES
[facility]  ED Nurse Handoff Report    Miguelina Weathers is a 44 year old female   ED Chief complaint: Flank Pain and Fever  . ED Diagnosis:   Final diagnoses:   None     Allergies:   Allergies   Allergen Reactions     Cephalexin Hives     Codeine Hives     Penicillins Hives     Sulfa Drugs Hives     Erythromycin Other (See Comments)     ? Rheumatic fever       Code Status: Full Code  Activity level - Baseline/Home:  Independent. Activity Level - Current:   Stand by Assist. Lift room needed: No. Bariatric: No   Needed: No   Isolation: No. Infection: Not Applicable.     Vital Signs:   Vitals:    01/21/20 1405 01/21/20 1445 01/21/20 1500 01/21/20 1550   BP:   122/82 111/75   Pulse:   109 80   Resp:       Temp:       TempSrc:       SpO2:  100% 99% 100%   Weight: 58.1 kg (128 lb)          Cardiac Rhythm:  ,      Pain level: 0-10 Pain Scale: 3  Patient confused: No. Patient Falls Risk: Yes.   Elimination Status: Has voided   Patient Report - Initial Complaint: Flank pain, fever. Focused Assessment: - Dx with a kidney stone on Sunday. Still with persistent left sided flank pain and now fevers. Brought a sample cup with what looked like a stone. Also c/o of intermittent nausea.  Tests Performed: Labs, CT. Abnormal Results:   Labs Ordered and Resulted from Time of ED Arrival Up to the Time of Departure from the ED   CBC WITH PLATELETS DIFFERENTIAL - Abnormal; Notable for the following components:       Result Value    WBC 16.0 (*)     Platelet Count 119 (*)     Absolute Neutrophil 13.6 (*)     Absolute Monocytes 1.5 (*)     All other components within normal limits   ROUTINE UA WITH MICROSCOPIC - Abnormal; Notable for the following components:    Ketones Urine 40 (*)     Blood Urine Moderate (*)     Protein Albumin Urine 50 (*)     WBC Urine 7 (*)     RBC Urine 7 (*)     Bacteria Urine Moderate (*)     Mucous Urine Present (*)     All other components within normal limits   BASIC METABOLIC PANEL    LACTIC ACID WHOLE BLOOD   SURGICAL PATHOLOGY EXAM   URINE CULTURE AEROBIC BACTERIAL   STONE ANALYSIS   INFLUENZA A/B ANTIGEN     Abd/pelvis CT no contrast - Stone Protocol    (Results Pending)     .   Treatments provided: 1 L bolus NS, 0.5 mg ativan, 0.5 mg dilaudid, 975 mg tylenol, 500 mg Cipro, 4 mg Zofran, 15 mg toradol  [facility]  OBS brochure/video discussed/provided to patient:  N/A  ED Medications:   Medications   0.9% sodium chloride BOLUS (0 mLs Intravenous Stopped 1/21/20 1514)     Followed by   sodium chloride 0.9% infusion (has no administration in time range)   ketorolac (TORADOL) injection 15 mg (15 mg Intravenous Given 1/21/20 1408)   ondansetron (ZOFRAN) injection 4 mg (4 mg Intravenous Given 1/21/20 1408)   ciprofloxacin (CIPRO) tablet 500 mg (500 mg Oral Given 1/21/20 1530)   acetaminophen (TYLENOL) tablet 975 mg (975 mg Oral Given 1/21/20 1514)   HYDROmorphone (PF) (DILAUDID) injection 0.5 mg (0.5 mg Intravenous Given 1/21/20 1531)   LORazepam (ATIVAN) injection 0.5 mg (0.5 mg Intravenous Given 1/21/20 1534)     Drips infusing:  No  For the majority of the shift, the patient's behavior Green. Interventions performed were N/A.     Severe Sepsis OR Septic Shock Diagnosis Present: No      ED Nurse Name/Phone Number: Jacquie Ludwig RN,   3:54 PM    RECEIVING UNIT ED HANDOFF REVIEW    Above ED Nurse Handoff Report was reviewed: Yes  Reviewed by: Zacarias Pandey RN on January 21, 2020 at 4:16 PM

## 2020-01-22 PROBLEM — N12 PYELONEPHRITIS: Status: ACTIVE | Noted: 2020-01-22

## 2020-01-22 LAB
BACTERIA SPEC CULT: ABNORMAL
Lab: ABNORMAL
SPECIMEN SOURCE: ABNORMAL

## 2020-01-22 PROCEDURE — 25800030 ZZH RX IP 258 OP 636: Performed by: INTERNAL MEDICINE

## 2020-01-22 PROCEDURE — 25000128 H RX IP 250 OP 636: Performed by: INTERNAL MEDICINE

## 2020-01-22 PROCEDURE — 12000000 ZZH R&B MED SURG/OB

## 2020-01-22 PROCEDURE — 99232 SBSQ HOSP IP/OBS MODERATE 35: CPT | Performed by: INTERNAL MEDICINE

## 2020-01-22 PROCEDURE — 99207 ZZC CDG-CHARGE REQUIRED MANUAL ENTRY: CPT | Performed by: INTERNAL MEDICINE

## 2020-01-22 PROCEDURE — 25000132 ZZH RX MED GY IP 250 OP 250 PS 637: Performed by: INTERNAL MEDICINE

## 2020-01-22 PROCEDURE — G0378 HOSPITAL OBSERVATION PER HR: HCPCS

## 2020-01-22 RX ORDER — SUMATRIPTAN 25 MG/1
25 TABLET, FILM COATED ORAL ONCE
Status: COMPLETED | OUTPATIENT
Start: 2020-01-22 | End: 2020-01-22

## 2020-01-22 RX ADMIN — IBUPROFEN 400 MG: 400 TABLET ORAL at 14:59

## 2020-01-22 RX ADMIN — HYDROXYZINE HYDROCHLORIDE 25 MG: 25 TABLET ORAL at 15:06

## 2020-01-22 RX ADMIN — ACETAMINOPHEN 650 MG: 325 TABLET, FILM COATED ORAL at 19:52

## 2020-01-22 RX ADMIN — CIPROFLOXACIN HYDROCHLORIDE 500 MG: 500 TABLET, FILM COATED ORAL at 07:48

## 2020-01-22 RX ADMIN — POLYETHYLENE GLYCOL 3350 17 G: 17 POWDER, FOR SOLUTION ORAL at 09:45

## 2020-01-22 RX ADMIN — SODIUM CHLORIDE: 9 INJECTION, SOLUTION INTRAVENOUS at 10:00

## 2020-01-22 RX ADMIN — SODIUM CHLORIDE: 9 INJECTION, SOLUTION INTRAVENOUS at 02:00

## 2020-01-22 RX ADMIN — SENNOSIDES AND DOCUSATE SODIUM 2 TABLET: 8.6; 5 TABLET ORAL at 07:48

## 2020-01-22 RX ADMIN — SUMATRIPTAN SUCCINATE 25 MG: 25 TABLET ORAL at 20:44

## 2020-01-22 RX ADMIN — HYDROMORPHONE HYDROCHLORIDE 2 MG: 2 TABLET ORAL at 00:16

## 2020-01-22 RX ADMIN — ACETAMINOPHEN 650 MG: 325 TABLET, FILM COATED ORAL at 13:45

## 2020-01-22 RX ADMIN — SENNOSIDES AND DOCUSATE SODIUM 1 TABLET: 8.6; 5 TABLET ORAL at 20:44

## 2020-01-22 RX ADMIN — HYDROMORPHONE HYDROCHLORIDE 2 MG: 2 TABLET ORAL at 13:39

## 2020-01-22 RX ADMIN — HYDROMORPHONE HYDROCHLORIDE 2 MG: 2 TABLET ORAL at 09:44

## 2020-01-22 RX ADMIN — SODIUM CHLORIDE: 9 INJECTION, SOLUTION INTRAVENOUS at 18:35

## 2020-01-22 RX ADMIN — ONDANSETRON 4 MG: 4 TABLET, ORALLY DISINTEGRATING ORAL at 13:40

## 2020-01-22 RX ADMIN — CIPROFLOXACIN HYDROCHLORIDE 500 MG: 500 TABLET, FILM COATED ORAL at 19:52

## 2020-01-22 RX ADMIN — SUMATRIPTAN SUCCINATE 25 MG: 25 TABLET ORAL at 10:56

## 2020-01-22 ASSESSMENT — ACTIVITIES OF DAILY LIVING (ADL)
ADLS_ACUITY_SCORE: 12

## 2020-01-22 NOTE — PROGRESS NOTES
[facility]    Medicine Progress Note - Hospitalist Service       Date of Admission:  1/21/2020  Assessment & Plan   Miguelina Weathers is a 44 year old female admitted on 1/21/2020. She has no known significant past medical history.  She was recently found to have left ureterolithiasis 2 days ago.  Return to emergency room today with fever, flank pain, nausea, vomiting, general body aches.  Diagnosed with possible pyelonephritis.     1.  Pyelonephritis.  Urine culture now with E. coli.  Started on oral ciprofloxacin in the emergency room.  Allergic to penicillin and cephalosporins.  Symptoms slowly improving.  -Continue ciprofloxacin.  Adjust antibiotics if needed based on culture sensitivities.  -Urine culture growing E. coli.  Sensitivities in progress.  -Pain medications as needed.    -IV fluids.     2.  Recent ureterolithiasis.  Now with passed stone.  Repeat CT scan shows no evidence of residual stone.    3.  Migraine headache.  Does have a history of migraines.  -Give 1 dose sumatriptan and 25 mg now.  -Pain medications as needed.    Diet: Regular Diet Adult    DVT Prophylaxis: Ambulate every shift  Reyes Catheter: not present  Code Status: Full Code      Disposition Plan   Expected discharge: Tomorrow, recommended to prior living arrangement     Elbert Butler, DO  Hospitalist Service  [facility]    ______________________________________________________________________    Interval History   Having a headache that feels like her prior migraines.  Continues having left flank pain but improved from previous.  Is having nausea, mostly when taking the pain medicines.  Did feel feverish overnight.  No fevers this morning.  Denies chest pain, shortness of breath, or diarrhea.    Data reviewed today: I reviewed all medications, new labs and imaging results over the last 24 hours.    Physical Exam   Vital Signs: Temp: 98.2  F (36.8  C) Temp src: Axillary BP: 97/59 Pulse: 71 Heart Rate: 78  Resp: 18 SpO2: 98 % O2 Device: None (Room air)    Weight: 136 lbs 14.49 oz  Gen:  NAD, A&Ox3.  Eyes:  PERRL, sclera anicteric.  OP:  MMM, no lesions.  Neck:  Supple.  CV:  Regular, no murmurs.  Lung:  CTA b/l, normal effort.  Ab:  +BS, soft.  Skin:  Warm, dry to touch.  No rash.  Ext:  No pitting edema LE b/l.      Data   Recent Labs   Lab 01/21/20  1403 01/19/20  1123   WBC 16.0* 9.6   HGB 12.7 13.5   MCV 95 95   * 189    139   POTASSIUM 3.6 3.8   CHLORIDE 107 108   CO2 28 22   BUN 11 15   CR 0.96 0.87   ANIONGAP 5 9   KHANH 8.6 8.9   GLC 96 154*

## 2020-01-22 NOTE — PLAN OF CARE
Admitted to inpatient. Alert and oriented. Soft BP, tmax 102.8, Tylenol, ibuprofen given given. Improved. Placed ice packs.Migraine, imitrex given, ineffective. Paged requesting additional migraine meds x2. Atarax x1. Patient refusing IV dilaudid. IVF. Regular diet with poor appetite. Zofran x1. Dilaudid x2. IVF. Cipro.

## 2020-01-22 NOTE — PLAN OF CARE
PRIMARY DIAGNOSIS: ACUTE PAIN  OUTPATIENT/OBSERVATION GOALS TO BE MET BEFORE DISCHARGE:  1. Pain Status: Improved-controlled with oral pain medications.    2. Return to near baseline physical activity: Yes    3. Cleared for discharge by consultants (if involved): No    Discharge Planner Nurse   Safe discharge environment identified: Yes  Barriers to discharge: Yes, patient still having abdominal pain       Entered by: Amelia Garcia 01/22/2020 4:41 AM     Please review provider order for any additional goals.   Nurse to notify provider when observation goals have been met and patient is ready for discharge.    Patient up SBA.  Temp max 102.5. PO dilaudid for pain.  Denies nausea.

## 2020-01-22 NOTE — PLAN OF CARE
New admit this shift.  Full code.  Alert and oriented.  T max: 100.5 oral. VSS on room air.  PRN Tylenol given x1.  NS @ 125.  CT shows no stone nor hydronephrosis.  Brought in stone from home and sent to lab.  Tolerating a regular diet.  Up with stand by assist.  Probable discharge tomorrow.

## 2020-01-22 NOTE — PLAN OF CARE
PRIMARY DIAGNOSIS: PYELONEPHRITIS  OUTPATIENT/OBSERVATION GOALS TO BE MET BEFORE DISCHARGE:  Diagnostic test and consults (if applicable) complete: Yes    Vitals signs stable or return to baseline: Yes    Tolerate oral intake to maintain hydration: Yes    Pain status: Improved-controlled with oral pain medications.    Tolerating oral antibiotics or has plans for home infusion setup:  Yes    Return to near baseline physical activity: Yes    Discharge Planner Nurse   Safe discharge environment identified: Yes  Barriers to discharge: Yes       Entered by: Piter Berman 01/22/2020 9:14 AM     Please review provider order for any additional goals.   Nurse to notify provider when observation goals have been met and patient is ready for discharge.

## 2020-01-22 NOTE — PLAN OF CARE
PRIMARY DIAGNOSIS: ACUTE PAIN  OUTPATIENT/OBSERVATION GOALS TO BE MET BEFORE DISCHARGE:  1. Pain Status: Improved-controlled with oral pain medications.    2. Return to near baseline physical activity: Yes    3. Cleared for discharge by consultants (if involved): No    Discharge Planner Nurse   Safe discharge environment identified: Yes  Barriers to discharge: Yes, still having abdominal/flank pain       Entered by: Amelia Garcia 01/22/2020 5:29 AM     Please review provider order for any additional goals.   Nurse to notify provider when observation goals have been met and patient is ready for discharge.    Patient up SBA, denies nausea, took PO dilaudid for left flank pain this shift.

## 2020-01-23 ENCOUNTER — APPOINTMENT (OUTPATIENT)
Dept: GENERAL RADIOLOGY | Facility: CLINIC | Age: 45
DRG: 690 | End: 2020-01-23
Attending: INTERNAL MEDICINE
Payer: COMMERCIAL

## 2020-01-23 LAB
ANION GAP SERPL CALCULATED.3IONS-SCNC: 4 MMOL/L (ref 3–14)
BUN SERPL-MCNC: 9 MG/DL (ref 7–30)
CALCIUM SERPL-MCNC: 8.1 MG/DL (ref 8.5–10.1)
CHLORIDE SERPL-SCNC: 115 MMOL/L (ref 94–109)
CO2 SERPL-SCNC: 26 MMOL/L (ref 20–32)
CREAT SERPL-MCNC: 0.82 MG/DL (ref 0.52–1.04)
ERYTHROCYTE [DISTWIDTH] IN BLOOD BY AUTOMATED COUNT: 12.7 % (ref 10–15)
GFR SERPL CREATININE-BSD FRML MDRD: 87 ML/MIN/{1.73_M2}
GLUCOSE SERPL-MCNC: 100 MG/DL (ref 70–99)
HCT VFR BLD AUTO: 34.7 % (ref 35–47)
HGB BLD-MCNC: 11 G/DL (ref 11.7–15.7)
MCH RBC QN AUTO: 31.2 PG (ref 26.5–33)
MCHC RBC AUTO-ENTMCNC: 31.7 G/DL (ref 31.5–36.5)
MCV RBC AUTO: 98 FL (ref 78–100)
PLATELET # BLD AUTO: 113 10E9/L (ref 150–450)
POTASSIUM SERPL-SCNC: 3.9 MMOL/L (ref 3.4–5.3)
RBC # BLD AUTO: 3.53 10E12/L (ref 3.8–5.2)
SODIUM SERPL-SCNC: 145 MMOL/L (ref 133–144)
WBC # BLD AUTO: 6.4 10E9/L (ref 4–11)

## 2020-01-23 PROCEDURE — 25000132 ZZH RX MED GY IP 250 OP 250 PS 637: Performed by: INTERNAL MEDICINE

## 2020-01-23 PROCEDURE — 12000000 ZZH R&B MED SURG/OB

## 2020-01-23 PROCEDURE — 74019 RADEX ABDOMEN 2 VIEWS: CPT

## 2020-01-23 PROCEDURE — 36415 COLL VENOUS BLD VENIPUNCTURE: CPT | Performed by: INTERNAL MEDICINE

## 2020-01-23 PROCEDURE — 25800030 ZZH RX IP 258 OP 636: Performed by: INTERNAL MEDICINE

## 2020-01-23 PROCEDURE — 99232 SBSQ HOSP IP/OBS MODERATE 35: CPT | Performed by: INTERNAL MEDICINE

## 2020-01-23 PROCEDURE — 80048 BASIC METABOLIC PNL TOTAL CA: CPT | Performed by: INTERNAL MEDICINE

## 2020-01-23 PROCEDURE — 85027 COMPLETE CBC AUTOMATED: CPT | Performed by: INTERNAL MEDICINE

## 2020-01-23 RX ORDER — SUMATRIPTAN 25 MG/1
25 TABLET, FILM COATED ORAL ONCE
Status: COMPLETED | OUTPATIENT
Start: 2020-01-23 | End: 2020-01-23

## 2020-01-23 RX ORDER — POLYETHYLENE GLYCOL 3350 17 G/17G
17 POWDER, FOR SOLUTION ORAL 2 TIMES DAILY PRN
Status: DISCONTINUED | OUTPATIENT
Start: 2020-01-23 | End: 2020-01-24 | Stop reason: HOSPADM

## 2020-01-23 RX ADMIN — IBUPROFEN 400 MG: 400 TABLET ORAL at 15:01

## 2020-01-23 RX ADMIN — SENNOSIDES AND DOCUSATE SODIUM 1 TABLET: 8.6; 5 TABLET ORAL at 20:14

## 2020-01-23 RX ADMIN — HYDROXYZINE HYDROCHLORIDE 50 MG: 25 TABLET ORAL at 18:04

## 2020-01-23 RX ADMIN — SUMATRIPTAN SUCCINATE 25 MG: 25 TABLET ORAL at 11:08

## 2020-01-23 RX ADMIN — ACETAMINOPHEN 650 MG: 325 TABLET, FILM COATED ORAL at 23:44

## 2020-01-23 RX ADMIN — SENNOSIDES AND DOCUSATE SODIUM 2 TABLET: 8.6; 5 TABLET ORAL at 07:58

## 2020-01-23 RX ADMIN — CIPROFLOXACIN HYDROCHLORIDE 500 MG: 500 TABLET, FILM COATED ORAL at 07:59

## 2020-01-23 RX ADMIN — HYDROMORPHONE HYDROCHLORIDE 2 MG: 2 TABLET ORAL at 05:09

## 2020-01-23 RX ADMIN — SODIUM CHLORIDE: 9 INJECTION, SOLUTION INTRAVENOUS at 02:34

## 2020-01-23 RX ADMIN — IBUPROFEN 400 MG: 400 TABLET ORAL at 02:39

## 2020-01-23 RX ADMIN — CIPROFLOXACIN HYDROCHLORIDE 500 MG: 500 TABLET, FILM COATED ORAL at 20:14

## 2020-01-23 RX ADMIN — POLYETHYLENE GLYCOL 3350 17 G: 17 POWDER, FOR SOLUTION ORAL at 10:26

## 2020-01-23 RX ADMIN — ACETAMINOPHEN 650 MG: 325 TABLET, FILM COATED ORAL at 17:08

## 2020-01-23 RX ADMIN — IBUPROFEN 400 MG: 400 TABLET ORAL at 21:00

## 2020-01-23 RX ADMIN — IBUPROFEN 400 MG: 400 TABLET ORAL at 07:59

## 2020-01-23 ASSESSMENT — ACTIVITIES OF DAILY LIVING (ADL)
ADLS_ACUITY_SCORE: 12

## 2020-01-23 NOTE — PLAN OF CARE
Full code.  VSS on room air. T max: 98.8.  PRN Tylenol given x1.  X1 dose of Imitrex given.  Tolerating a regular diet. Better appetite tonight.  Up independently.  NS @ 125  Urine culture: E Coli  PO Cipro q12h.  Alert and oriented.  Discharge pending.

## 2020-01-23 NOTE — PLAN OF CARE
Ambulatory Status:  Pt up Ind.  VS:  stable; afebrile.  Pain:  5/10 for headache; gave Advil and Imitrex  Resp: LS clear on RA  GI:  denies nausea.  regular diet.  BS hypoactive.  Passing flatus.  Last BM 1/20; gave senna and miralax.  :  voiding  Tx:  Cipro  Labs:  K 3.9. Na 145.  Disposition:  TBD

## 2020-01-23 NOTE — PLAN OF CARE
Alert and oriented. Complains of flank pain. Ibuprofen X 1 but did need PO Dilaudid X 1. Up in room independently. Afebrile. Possible discharge.

## 2020-01-23 NOTE — PROGRESS NOTES
[facility]    Medicine Progress Note - Hospitalist Service       Date of Admission:  1/21/2020  Assessment & Plan   Miguelina Weathers is a 44 year old female admitted on 1/21/2020. She has no known significant past medical history.  She was recently found to have left ureterolithiasis 2 days ago.  Return to emergency room today with fever, flank pain, nausea, vomiting, general body aches.  Diagnosed with possible pyelonephritis.     1.  Pyelonephritis.  Urine culture now with E. coli.  Started on oral ciprofloxacin in the emergency room.  Allergic to penicillin and cephalosporins.  Symptoms slowly improving.  -Continue ciprofloxacin.   -Urine culture growing E. coli.    -Pain medications as needed.    -Stop IV fluids.     2.  Recent ureterolithiasis.  Now with passed stone.  Repeat CT scan shows no evidence of residual stone.     3.  Migraine headache.  Does have a history of migraines.  -Did require repeat sumatriptan and today, 1/23.    -Is feeling quite a bit better this afternoon after Sumatriptan.    Diet: Regular Diet Adult    DVT Prophylaxis: Ambulate every shift  Reyes Catheter: not present  Code Status: Full Code      Disposition Plan   Expected discharge: Tomorrow, recommended to prior living arrangement     Elbert Butler DO  Hospitalist Service  [facility]    ______________________________________________________________________    Interval History   Having some abdominal distention.  Feels constipated.  Left flank pain improved from previous.  Did notice fever overnight.  Denies chest pain, shortness of breath, nausea, vomiting, or diarrhea.    Data reviewed today: I reviewed all medications, new labs and imaging results over the last 24 hours.     Physical Exam   Vital Signs: Temp: 98.8  F (37.1  C) Temp src: Oral BP: 105/66   Heart Rate: 71 Resp: 16 SpO2: 95 % O2 Device: None (Room air)    Weight: 136 lbs 14.49 oz  Gen:  NAD, A&Ox3.  Eyes:  PERRL, sclera anicteric.  OP:   MMM, no lesions.  Neck:  Supple.  CV:  Regular, no murmurs.  Lung:  CTA b/l, normal effort.  Ab:  +BS, soft.  Skin:  Warm, dry to touch.  No rash.  Ext:  No pitting edema LE b/l.      Data   Recent Labs   Lab 01/23/20  0745 01/21/20  1403 01/19/20  1123   WBC 6.4 16.0* 9.6   HGB 11.0* 12.7 13.5   MCV 98 95 95   * 119* 189   * 140 139   POTASSIUM 3.9 3.6 3.8   CHLORIDE 115* 107 108   CO2 26 28 22   BUN 9 11 15   CR 0.82 0.96 0.87   ANIONGAP 4 5 9   KHANH 8.1* 8.6 8.9   * 96 154*

## 2020-01-23 NOTE — CONSULTS
Care Transition Initial Assessment - RN        Met with: Patient.  DATA   Active Problems:    Flank pain    Pyelonephritis       Cognitive Status: awake, alert and oriented.  Primary Care Clinic Name: No PCP  Primary Care MD Name: (Establishing care with [facility])  Contact information and PCP information verified: Yes  Lives With: child(allan), dependent      Quality of Family Relationships: involved, supportive  Description of Support System: Supportive, Involved   Who is your support system?: Children, Parent(s)       Insurance concerns: No Insurance issues identified  ASSESSMENT  Patient currently receives the following services:  none        Identified issues/concerns regarding health management: Patient identifies with no PCP listed on facesheet. Patient admitted 1/21 with flank pain, diagnosed with pyelonephritis. Patient lives at home with her daughter. She is independent in all cares. Met with patient for establishing care with a primary care provider. Discussed importance of having a PCP and encouraged post hospitalization follow up. Patient states she has been generally healthy but recognizes benefit of establishing care.  Patient given choices for primary clinics. She would like to follow with a provider at [facility]. A post hospitalization follow up appointment was scheduled with [facility] provider on Tuesday, Jan. 28th at 10:00 am.     PLAN  Financial costs for the patient include none .  Patient given options and choices for discharge yes .  Patient/family is agreeable to the plan?  Yes: home.   Patient anticipates discharging to home with daughter (14y.o). Patient also has support from her mother and friends.        Patient anticipates needs for home equipment: Yes  Transportation/person available to transport on day of discharge is mother or friend, Elo.     Plan/Disposition: Home   Appointments:   Appointment scheduled with Ramona Aaseby-Aguilera PA-C  with [facility] on Tuesday, Jan 28, 2020 at 10:00 am.     Care  (CTS) will continue to follow as needed.    Anusha Ang RN BSN   Inpatient Care Coordination   [facility]   [phone]

## 2020-01-23 NOTE — PROGRESS NOTES
X-cover    Called for request for add'l dose of sumitriptan.  Has only rec'd 25 mg earlier today, ok for 25 mg prn once more.  50 mg max in any 24 hour period

## 2020-01-24 VITALS
WEIGHT: 136.91 LBS | BODY MASS INDEX: 19.6 KG/M2 | OXYGEN SATURATION: 96 % | TEMPERATURE: 98.4 F | HEART RATE: 71 BPM | DIASTOLIC BLOOD PRESSURE: 76 MMHG | SYSTOLIC BLOOD PRESSURE: 117 MMHG | RESPIRATION RATE: 18 BRPM | HEIGHT: 70 IN

## 2020-01-24 PROCEDURE — 99239 HOSP IP/OBS DSCHRG MGMT >30: CPT | Performed by: INTERNAL MEDICINE

## 2020-01-24 PROCEDURE — 25000132 ZZH RX MED GY IP 250 OP 250 PS 637: Performed by: INTERNAL MEDICINE

## 2020-01-24 RX ORDER — SUMATRIPTAN 25 MG/1
25 TABLET, FILM COATED ORAL
Qty: 10 TABLET | Refills: 0 | Status: SHIPPED | OUTPATIENT
Start: 2020-01-24

## 2020-01-24 RX ORDER — CIPROFLOXACIN 500 MG/1
500 TABLET, FILM COATED ORAL EVERY 12 HOURS
Qty: 28 TABLET | Refills: 0 | Status: SHIPPED | OUTPATIENT
Start: 2020-01-24 | End: 2020-02-07

## 2020-01-24 RX ORDER — ACETAMINOPHEN 325 MG/1
650 TABLET ORAL EVERY 4 HOURS PRN
COMMUNITY
Start: 2020-01-24

## 2020-01-24 RX ORDER — HYDROXYZINE HYDROCHLORIDE 25 MG/1
25-50 TABLET, FILM COATED ORAL EVERY 6 HOURS PRN
Qty: 20 TABLET | Refills: 0 | Status: SHIPPED | OUTPATIENT
Start: 2020-01-24

## 2020-01-24 RX ADMIN — SENNOSIDES AND DOCUSATE SODIUM 1 TABLET: 8.6; 5 TABLET ORAL at 08:18

## 2020-01-24 RX ADMIN — IBUPROFEN 400 MG: 400 TABLET ORAL at 08:18

## 2020-01-24 RX ADMIN — CIPROFLOXACIN HYDROCHLORIDE 500 MG: 500 TABLET, FILM COATED ORAL at 08:18

## 2020-01-24 RX ADMIN — HYDROXYZINE HYDROCHLORIDE 50 MG: 25 TABLET ORAL at 10:52

## 2020-01-24 RX ADMIN — ACETAMINOPHEN 650 MG: 325 TABLET, FILM COATED ORAL at 08:43

## 2020-01-24 ASSESSMENT — ACTIVITIES OF DAILY LIVING (ADL)
ADLS_ACUITY_SCORE: 12

## 2020-01-24 NOTE — PROGRESS NOTES
Pt to D/C to home.  Pt provided with d/c instructions, including new medications, when medications were last given, and when to take them again.  Pt also informed to f/u with PCP as scheduled.  Pt verbalized understanding of all d/c and f/u instructions.  All questions were answered at this time.  Copy of paperwork sent with pt.  Scripts x 3 sent to patient's pharmacy of choice.  Mother to provide transport.  All personal belongings sent with pt.     Flu Vaccine: Declined

## 2020-01-24 NOTE — PLAN OF CARE
Pt up walking in room and sommer independently. She also sits up in chair. Pain in abdomen, Atarax, Advil and Tylenol for comfort. Afebrile. Pt had been constipated per report. Abdomen firm and she feels it is full and distended from her baseline. She has BM today after intervention.

## 2020-01-25 LAB
APPEARANCE STONE: NORMAL
COMPN STONE: NORMAL
NUMBER STONE: 1
SIZE STONE: NORMAL MM
WT STONE: 2 MG

## 2020-01-28 ENCOUNTER — OFFICE VISIT (OUTPATIENT)
Dept: FAMILY MEDICINE | Facility: CLINIC | Age: 45
End: 2020-01-28
Payer: COMMERCIAL

## 2020-01-28 VITALS
HEART RATE: 66 BPM | TEMPERATURE: 97.8 F | WEIGHT: 129 LBS | OXYGEN SATURATION: 99 % | DIASTOLIC BLOOD PRESSURE: 70 MMHG | RESPIRATION RATE: 16 BRPM | SYSTOLIC BLOOD PRESSURE: 100 MMHG | HEIGHT: 70 IN | BODY MASS INDEX: 18.47 KG/M2

## 2020-01-28 DIAGNOSIS — N12 PYELONEPHRITIS: Primary | ICD-10-CM

## 2020-01-28 LAB
ALBUMIN UR-MCNC: NEGATIVE MG/DL
APPEARANCE UR: CLEAR
BASOPHILS # BLD AUTO: 0 10E9/L (ref 0–0.2)
BASOPHILS NFR BLD AUTO: 0.2 %
BILIRUB UR QL STRIP: NEGATIVE
COLOR UR AUTO: YELLOW
DIFFERENTIAL METHOD BLD: NORMAL
EOSINOPHIL # BLD AUTO: 0.2 10E9/L (ref 0–0.7)
EOSINOPHIL NFR BLD AUTO: 2.5 %
ERYTHROCYTE [DISTWIDTH] IN BLOOD BY AUTOMATED COUNT: 13 % (ref 10–15)
GLUCOSE UR STRIP-MCNC: NEGATIVE MG/DL
HCT VFR BLD AUTO: 44.9 % (ref 35–47)
HGB BLD-MCNC: 14.7 G/DL (ref 11.7–15.7)
HGB UR QL STRIP: NEGATIVE
KETONES UR STRIP-MCNC: NEGATIVE MG/DL
LEUKOCYTE ESTERASE UR QL STRIP: NEGATIVE
LYMPHOCYTES # BLD AUTO: 1.8 10E9/L (ref 0.8–5.3)
LYMPHOCYTES NFR BLD AUTO: 27.5 %
MCH RBC QN AUTO: 30.7 PG (ref 26.5–33)
MCHC RBC AUTO-ENTMCNC: 32.7 G/DL (ref 31.5–36.5)
MCV RBC AUTO: 94 FL (ref 78–100)
MONOCYTES # BLD AUTO: 0.6 10E9/L (ref 0–1.3)
MONOCYTES NFR BLD AUTO: 9.6 %
NEUTROPHILS # BLD AUTO: 3.9 10E9/L (ref 1.6–8.3)
NEUTROPHILS NFR BLD AUTO: 60.2 %
NITRATE UR QL: NEGATIVE
PH UR STRIP: 6 PH (ref 5–7)
PLATELET # BLD AUTO: 409 10E9/L (ref 150–450)
RBC # BLD AUTO: 4.79 10E12/L (ref 3.8–5.2)
SOURCE: NORMAL
SP GR UR STRIP: <=1.005 (ref 1–1.03)
UROBILINOGEN UR STRIP-ACNC: 0.2 EU/DL (ref 0.2–1)
WBC # BLD AUTO: 6.4 10E9/L (ref 4–11)

## 2020-01-28 PROCEDURE — 81003 URINALYSIS AUTO W/O SCOPE: CPT | Performed by: PHYSICIAN ASSISTANT

## 2020-01-28 PROCEDURE — 85025 COMPLETE CBC W/AUTO DIFF WBC: CPT | Performed by: PHYSICIAN ASSISTANT

## 2020-01-28 PROCEDURE — 36415 COLL VENOUS BLD VENIPUNCTURE: CPT | Performed by: PHYSICIAN ASSISTANT

## 2020-01-28 PROCEDURE — 99203 OFFICE O/P NEW LOW 30 MIN: CPT | Performed by: PHYSICIAN ASSISTANT

## 2020-01-28 SDOH — HEALTH STABILITY: MENTAL HEALTH: HOW OFTEN DO YOU HAVE A DRINK CONTAINING ALCOHOL?: 2-4 TIMES A MONTH

## 2020-01-28 ASSESSMENT — MIFFLIN-ST. JEOR: SCORE: 1315.39

## 2020-01-28 NOTE — PROGRESS NOTES
Subjective     Miguelina Weathers is a 44 year old female who presents to clinic today for the following health issues:    AMRIK Syed was initially seen in the ED on January 19, 2020 for kidney stones.  She was sent home with a strainer and passed that kidney stone but developed increasing flank pain and fever and went back to  ED on 121 and diagnosed with a kidney infection.  The culture grew out E coli and she was treated with oral Cipro.  She had a repeat CT that showed no evidence of a residual stone    Hospital Follow-up Visit:    Hospital/Nursing Home/IP Rehab Facility: [facility]  Date of Admission: 01/21/2020  Date of Discharge: 01/24/2020  Reason(s) for Admission: pyelonephritis             Problems taking medications regularly:  None       Medication changes since discharge: cipro       Problems adhering to non-medication therapy:  None    Summary of hospitalization:  [facility] discharge summary reviewed  Diagnostic Tests/Treatments reviewed.  Follow up needed: none  Other Healthcare Providers Involved in Patient s Care:         None  Update since discharge: improved.     Post Discharge Medication Reconciliation: discharge medications reconciled, continue medications without change.  Plan of care communicated with patient     Coding guidelines for this visit:  Type of Medical   Decision Making Face-to-Face Visit       within 7 Days of discharge Face-to-Face Visit        within 14 days of discharge   Moderate Complexity [id number]   High Complexity [id number]                Current Outpatient Medications   Medication Sig Dispense Refill     acetaminophen (TYLENOL) 325 MG tablet Take 2 tablets (650 mg) by mouth every 4 hours as needed for mild pain       ciprofloxacin (CIPRO) 500 MG tablet Take 1 tablet (500 mg) by mouth every 12 hours for 14 days 28 tablet 0     levonorgestrel (MIRENA) 20 MCG/24HR IUD 1 each by Intrauterine route       hydrOXYzine (ATARAX) 25 MG tablet Take 1-2  "tablets (25-50 mg) by mouth every 6 hours as needed for itching or anxiety (pain) (Patient not taking: Reported on 1/28/2020) 20 tablet 0     SUMAtriptan (IMITREX) 25 MG tablet Take 1 tablet (25 mg) by mouth at onset of headache for migraine May repeat in 2 hours. Max 8 tablets/24 hours. (Patient not taking: Reported on 1/28/2020) 10 tablet 0     Recent Labs   Lab Test 01/23/20  0745 01/21/20  1403   CR 0.82 0.96   GFRESTIMATED 87 71   GFRESTBLACK >90 83   POTASSIUM 3.9 3.6      BP Readings from Last 3 Encounters:   01/28/20 100/70   01/24/20 117/76   01/19/20 105/54    Wt Readings from Last 3 Encounters:   01/28/20 58.5 kg (129 lb)   01/21/20 62.1 kg (136 lb 14.5 oz)   01/19/20 58.1 kg (128 lb)                      Reviewed and updated as needed this visit by Provider         Review of Systems   ROS COMP: Constitutional, HEENT, cardiovascular, pulmonary, GI, , musculoskeletal, neuro, skin, endocrine and psych systems are negative, except as otherwise noted.      Objective    /70 (BP Location: Right arm, Patient Position: Chair, Cuff Size: Adult Regular)   Pulse 66   Temp 97.8  F (36.6  C) (Oral)   Resp 16   Ht 1.778 m (5' 10\")   Wt 58.5 kg (129 lb)   SpO2 99%   BMI 18.51 kg/m    Body mass index is 18.51 kg/m .  Physical Exam   GENERAL: healthy, alert and no distress  RESP: lungs clear to auscultation - no rales, rhonchi or wheezes  CV: regular rate and rhythm, normal S1 S2, no S3 or S4, no murmur, click or rub, no peripheral edema and peripheral pulses strong  ABDOMEN: soft, nontender, no hepatosplenomegaly, no masses and bowel sounds normal  MS: no gross musculoskeletal defects noted, no edema  PSYCH: mentation appears normal, affect normal/bright    Diagnostic Test Results:  Labs reviewed in Epic        Assessment & Plan     1. Pyelonephritis  Symptoms are resolving and both the CBC and UA are within normal limits. Please follow-up if symptoms fail to resolve or worsen    - CBC with platelets " differential  - UA reflex to Microscopic and Culture       There are no Patient Instructions on file for this visit.    No follow-ups on file.    Ramona Ann Aaseby-Aguilera, PA-C  [facility]

## 2020-01-30 NOTE — DISCHARGE SUMMARY
"Physician Discharge Summary     Name: Miguelina Weathers    MRN: [id number]     YOB: 1975    Age: 44 year old                                             [facility]  Hospitalist Discharge Summary-[facility]      Primary care provider: No Ref-Primary, Physician      Admit date:  1/21/2020      Discharge date and time: 1/24/2020 12:52 PM       Discharge Physician:  Shamir Vallejo MD      Primary Discharge Diagnosis        #1.Acute  Pyelonephritis  #2.Migraine headache      Secondary Diagnosis /chronic medical conditions       ureterolithiasis        Brief Summary of Hospital stay :       Please refer to  Admission H&P note for full details of patient presentation.    Reason for Hospitalization(C/C,HPI and brief patient summary):Fevers, chills, nausea, vomiting, body aches, left flank pain.      Significant findings(Primary diagnosis )Procedures and treatments provided(Hospital course ,consults, procedures):Please see bellow for details    Miguelina Weathers is a 44 year old female admitted on 1/21/2020. She has no known significant past medical history.  She was recently found to have left ureterolithiasis 2 days ago.  Return to emergency room today with fever, flank pain, nausea, vomiting, general body aches.  Diagnosed with possible pyelonephritis.     1.  Pyelonephritis.  Urine culture now with E. coli.  Started on oral ciprofloxacin in the emergency room.  Allergic to penicillin and cephalosporins.  Symptoms slowly improving.  -Continue ciprofloxacin.    2.  Recent ureterolithiasis.  Now with passed stone.  Repeat CT scan shows no evidence of residual stone.     3.  Migraine headache.  Does have a history of migraines.  -Did require repeat sumatriptan     Consultations during hospital stay:    CARE COORDINATOR IP CONSULT      Patient discharge Condition:     stable    /76 (BP Location: Right arm)   Pulse 71   Temp 98.4  F (36.9  C) (Oral)   Resp 18   Ht 1.778 m (5' 10\")   Wt 62.1 kg " (136 lb 14.5 oz)   SpO2 96%   BMI 19.64 kg/m             Discharge Instructions:       Patient/family instructions: Written discharge instruction given to patient/family    Discharge Medications:       Review of your medicines      START taking      Dose / Directions   acetaminophen 325 MG tablet  Commonly known as:  TYLENOL  Used for:  Pyelonephritis      Dose:  650 mg  Take 2 tablets (650 mg) by mouth every 4 hours as needed for mild pain  Refills:  0     ciprofloxacin 500 MG tablet  Commonly known as:  CIPRO  Indication:  Pyelonephritis  Used for:  Pyelonephritis      Dose:  500 mg  Take 1 tablet (500 mg) by mouth every 12 hours for 14 days  Quantity:  28 tablet  Refills:  0     hydrOXYzine 25 MG tablet  Commonly known as:  ATARAX  Used for:  Pyelonephritis      Dose:  25-50 mg  Take 1-2 tablets (25-50 mg) by mouth every 6 hours as needed for itching or anxiety (pain)  Quantity:  20 tablet  Refills:  0     SUMAtriptan 25 MG tablet  Commonly known as:  IMITREX  Used for:  Pyelonephritis      Dose:  25 mg  Take 1 tablet (25 mg) by mouth at onset of headache for migraine May repeat in 2 hours. Max 8 tablets/24 hours.  Quantity:  10 tablet  Refills:  0        STOP taking    ondansetron 4 MG ODT tab  Commonly known as:  Zofran ODT        tamsulosin 0.4 MG capsule  Commonly known as:  Flomax              Where to get your medicines      These medications were sent to [facility]/pharmacy #[id number] - [place], [place] - [address]  [address]  [place] , [facility] [address]    Phone:  [phone]     ciprofloxacin 500 MG tablet    hydrOXYzine 25 MG tablet    SUMAtriptan 25 MG tablet     Some of these will need a paper prescription and others can be bought over the counter. Ask your nurse if you have questions.    You don't need a prescription for these medications    acetaminophen 325 MG tablet          Discharge diet:Orders Placed This Encounter      Diet    regular diet      Discharge activity:Activity as  tolerated      Discharge follow-up:    Follow up with primary care provider in 7 days or earlier if symptoms return or gets worse.    Follow up with consultant as instructed          Other instructions:    We discussed with patient/family about detail discharge instructions as well as discharge medications above including potential risks,side effects and benefits.Patient/family understood benefits and potential serious side effects of taking these medications and need to follow up with PCP if the patient develops complications.  Patient is also advised to see a doctor immediately for severe symptoms.        Major procedure performed/  Significant Diagnostic Studies:            Results for orders placed or performed during the hospital encounter of 01/21/20   Abd/pelvis CT no contrast - Stone Protocol    Narrative    CT ABDOMEN AND PELVIS WITHOUT CONTRAST   1/21/2020 3:51 PM     HISTORY: Left flank pain, fever.    TECHNIQUE: Noncontrast CT abdomen and pelvis was performed. Radiation  dose for this scan was reduced using automated exposure control,  adjustment of the mA and/or kV according to patient size, or iterative  reconstruction technique.    COMPARISON: CT abdomen and pelvis on 1/19/2020    FINDINGS:   Lower chest: Small left and trace right pleural effusions and  associated basilar atelectasis/consolidation.    Abdomen/pelvis:    Right kidney: No radiodense kidney stones or hydronephrosis.    Left kidney: No radiodense kidney stones or hydronephrosis. The  previously seen stone in the distal left ureter, is no longer seen.    Urinary bladder is unremarkable.    Remainder of the abdomen: Limited evaluation of the abdominal organs  due to lack of IV contrast. The unenhanced liver, gallbladder, spleen,  adrenal glands and pancreas are grossly unremarkable.    No abnormally dilated bowel loops. Small amount of free fluid in the  pelvis. No free peritoneal or portal venous gas. IUD is visualized  within the  endometrial cavity. Multiple pelvic phleboliths. The  appendix is visualized and appears normal. Moderate amount of  inspissated stool throughout the colon.    Bones and soft tissues: No suspicious osseous lesion.      Impression    IMPRESSION:  1. No radiodense kidney stones or hydronephrosis in either kidney. The  previously seen distal left ureter stone is no longer seen, likely  passed.  2. Moderate amount of stool throughout the colon.  3. Small amount of free fluid in the pelvis, of indeterminate  etiology.    VAL KUMAR MD   XR Abdomen 2 Views    Narrative    ABDOMEN TWO VIEWS January 23, 2020 at 1019 hours    INDICATION: Abdominal pain.    COMPARISON: CT from 1/21/2020.    FINDINGS: Bowel gas pattern is nonobstructed. A few air-fluid levels  are seen within the colon in the right lower quadrant. Pelvic  phleboliths. No acute osseous findings. IUD is present in the lower  pelvis.      Impression    IMPRESSION: No acute abdominal findings.    MIKE KRUGER MD       Recent Labs   Lab 01/28/20  1032 01/23/20  0745   WBC 6.4 6.4   HGB 14.7 11.0*   HCT 44.9 34.7*   MCV 94 98    113*     No results for input(s): CULT in the last 168 hours.  Recent Labs   Lab 01/23/20  0745   *   POTASSIUM 3.9   CHLORIDE 115*   CO2 26   ANIONGAP 4   *   BUN 9   CR 0.82   GFRESTIMATED 87   GFRESTBLACK >90   KHANH 8.1*       Recent Labs   Lab 01/23/20  0745   *       No results for input(s): INR in the last 168 hours.      Incidental findings that was discussed with patient/family and need follow up with PCP:     Pending Results:       Unresulted Labs Ordered in the Past 30 Days of this Admission     No orders found from 12/22/2019 to 1/22/2020.             Patient Allergies:       Allergies   Allergen Reactions     Cephalexin Hives     Codeine Hives     Penicillins Hives     Sulfa Drugs Hives     Erythromycin Other (See Comments)     ? Rheumatic fever         Disposition:     Disposition:  home    Discharge needs: none          I saw and evaluated the patient on day of discharge and  discharge instructions reviewed  and  all the patient's questions and concerns addressed. Over 30 minutes spent on discharge and coordination of discharge process for this patient.      Disclaimer: This note consists of symbols derived from keyboarding, dictation and/or voice recognition software. As a result, there may be errors in the script that have gone undetected. Please consider this when interpreting information found in this chart

## 2020-02-07 ENCOUNTER — MYC MEDICAL ADVICE (OUTPATIENT)
Dept: FAMILY MEDICINE | Facility: CLINIC | Age: 45
End: 2020-02-07

## 2020-02-07 DIAGNOSIS — Z20.828 EXPOSURE TO INFLUENZA: Primary | ICD-10-CM

## 2020-02-07 RX ORDER — OSELTAMIVIR PHOSPHATE 75 MG/1
75 CAPSULE ORAL DAILY
Qty: 7 CAPSULE | Refills: 0 | Status: SHIPPED | OUTPATIENT
Start: 2020-02-07 | End: 2020-02-14

## 2020-02-23 ENCOUNTER — HEALTH MAINTENANCE LETTER (OUTPATIENT)
Age: 45
End: 2020-02-23

## 2020-12-06 ENCOUNTER — HEALTH MAINTENANCE LETTER (OUTPATIENT)
Age: 45
End: 2020-12-06

## 2021-02-20 ENCOUNTER — HEALTH MAINTENANCE LETTER (OUTPATIENT)
Age: 46
End: 2021-02-20

## 2021-04-11 ENCOUNTER — HEALTH MAINTENANCE LETTER (OUTPATIENT)
Age: 46
End: 2021-04-11

## 2021-09-26 ENCOUNTER — HEALTH MAINTENANCE LETTER (OUTPATIENT)
Age: 46
End: 2021-09-26

## 2022-03-12 ENCOUNTER — HEALTH MAINTENANCE LETTER (OUTPATIENT)
Age: 47
End: 2022-03-12

## 2022-05-07 ENCOUNTER — HEALTH MAINTENANCE LETTER (OUTPATIENT)
Age: 47
End: 2022-05-07

## 2023-04-23 ENCOUNTER — HEALTH MAINTENANCE LETTER (OUTPATIENT)
Age: 48
End: 2023-04-23

## 2023-06-02 ENCOUNTER — HEALTH MAINTENANCE LETTER (OUTPATIENT)
Age: 48
End: 2023-06-02